# Patient Record
Sex: FEMALE | Race: BLACK OR AFRICAN AMERICAN | Employment: FULL TIME | URBAN - METROPOLITAN AREA
[De-identification: names, ages, dates, MRNs, and addresses within clinical notes are randomized per-mention and may not be internally consistent; named-entity substitution may affect disease eponyms.]

---

## 2021-11-15 ENCOUNTER — HOSPITAL ENCOUNTER (EMERGENCY)
Facility: HOSPITAL | Age: 46
Discharge: HOME | End: 2021-11-16
Attending: EMERGENCY MEDICINE
Payer: COMMERCIAL

## 2021-11-15 DIAGNOSIS — K59.03 DRUG-INDUCED CONSTIPATION: Primary | ICD-10-CM

## 2021-11-15 PROCEDURE — 99283 EMERGENCY DEPT VISIT LOW MDM: CPT

## 2021-11-15 PROCEDURE — 96372 THER/PROPH/DIAG INJ SC/IM: CPT

## 2021-11-16 VITALS
BODY MASS INDEX: 31.52 KG/M2 | RESPIRATION RATE: 20 BRPM | OXYGEN SATURATION: 98 % | HEART RATE: 97 BPM | HEIGHT: 68 IN | TEMPERATURE: 98 F | SYSTOLIC BLOOD PRESSURE: 142 MMHG | WEIGHT: 208 LBS | DIASTOLIC BLOOD PRESSURE: 82 MMHG

## 2021-11-16 RX ORDER — ACETAMINOPHEN 500 MG
1000 TABLET ORAL ONCE
Status: COMPLETED | OUTPATIENT
Start: 2021-11-16 | End: 2021-11-16

## 2021-11-16 RX ORDER — MAGNESIUM CARB/ALUMINUM HYDROX 105-160MG
296 TABLET,CHEWABLE ORAL ONCE
Status: COMPLETED | OUTPATIENT
Start: 2021-11-16 | End: 2021-11-16

## 2021-11-16 NOTE — ED QUICK NOTES
Pt provided and explained d/c instructions, at-home care, follow-up, and otc rx. Pt in nad at this time. No iv access. Vss. [place]. Ambulatory. A&ox3. Belongings with pt. All questions and concerns addressed.

## 2021-11-16 NOTE — ED INITIAL ASSESSMENT (HPI)
No bm for the 3, recent uterine fibroid embolization on Friday, rx pain meds and stool softeners at home, has not had success at home self correcting with dietary changes, milk of magnesia, and self administered enema.

## 2021-11-17 NOTE — ED PROVIDER NOTES
Patient Seen in: [facility] AND [facility] Emergency Department    History   Patient presents with:  Constipation      HPI    Patient presents to the ED complaining of severe constipation and abdominal discomfort.   She states that she had uterine fibroid emboliz well-developed. HENT:      Head: Normocephalic and atraumatic. Eyes:      General:         Right eye: No discharge. Left eye: No discharge. Conjunctiva/sclera: Conjunctivae normal.   Neck:      Trachea: No tracheal deviation.    Cardiovascu evaluation. ED Course: Patient presents to the ED with opiate use constipation. In significant distress. Given Relistor in the ED and mag citrate. Stable for discharge at this time with continued supportive care at home.     Additional verbal instruct

## 2025-02-23 ENCOUNTER — HOSPITAL ENCOUNTER (EMERGENCY)
Facility: HOSPITAL | Age: 50
Discharge: HOME | End: 2025-02-23
Attending: STUDENT IN AN ORGANIZED HEALTH CARE EDUCATION/TRAINING PROGRAM
Payer: COMMERCIAL

## 2025-02-23 ENCOUNTER — APPOINTMENT (OUTPATIENT)
Dept: GENERAL RADIOLOGY | Facility: HOSPITAL | Age: 50
End: 2025-02-23
Attending: STUDENT IN AN ORGANIZED HEALTH CARE EDUCATION/TRAINING PROGRAM
Payer: COMMERCIAL

## 2025-02-23 VITALS
TEMPERATURE: 98 F | HEART RATE: 65 BPM | BODY MASS INDEX: 30 KG/M2 | OXYGEN SATURATION: 98 % | SYSTOLIC BLOOD PRESSURE: 224 MMHG | DIASTOLIC BLOOD PRESSURE: 133 MMHG | RESPIRATION RATE: 18 BRPM | WEIGHT: 197 LBS

## 2025-02-23 DIAGNOSIS — M79.645 FINGER PAIN, LEFT: ICD-10-CM

## 2025-02-23 DIAGNOSIS — S46.812A STRAIN OF LEFT TRAPEZIUS MUSCLE, INITIAL ENCOUNTER: Primary | ICD-10-CM

## 2025-02-23 LAB
ATRIAL RATE: 82 BPM
P AXIS: 48 DEGREES
P-R INTERVAL: 178 MS
Q-T INTERVAL: 414 MS
QRS DURATION: 108 MS
QTC CALCULATION (BEZET): 483 MS
R AXIS: 3 DEGREES
T AXIS: 38 DEGREES
VENTRICULAR RATE: 82 BPM

## 2025-02-23 PROCEDURE — 73140 X-RAY EXAM OF FINGER(S): CPT | Performed by: STUDENT IN AN ORGANIZED HEALTH CARE EDUCATION/TRAINING PROGRAM

## 2025-02-23 PROCEDURE — 93010 ELECTROCARDIOGRAM REPORT: CPT

## 2025-02-23 PROCEDURE — 99284 EMERGENCY DEPT VISIT MOD MDM: CPT

## 2025-02-23 PROCEDURE — 96372 THER/PROPH/DIAG INJ SC/IM: CPT

## 2025-02-23 PROCEDURE — 93005 ELECTROCARDIOGRAM TRACING: CPT

## 2025-02-23 RX ORDER — CYCLOBENZAPRINE HCL 5 MG
10 TABLET ORAL ONCE
Status: COMPLETED | OUTPATIENT
Start: 2025-02-23 | End: 2025-02-23

## 2025-02-23 RX ORDER — LIDOCAINE 50 MG/G
1 PATCH TOPICAL EVERY 24 HOURS
Qty: 7 PATCH | Refills: 0 | Status: SHIPPED | OUTPATIENT
Start: 2025-02-23 | End: 2025-03-02

## 2025-02-23 RX ORDER — CYCLOBENZAPRINE HCL 10 MG
10 TABLET ORAL 3 TIMES DAILY PRN
Qty: 20 TABLET | Refills: 0 | Status: SHIPPED | OUTPATIENT
Start: 2025-02-23 | End: 2025-03-02

## 2025-02-23 RX ORDER — KETOROLAC TROMETHAMINE 15 MG/ML
15 INJECTION, SOLUTION INTRAMUSCULAR; INTRAVENOUS ONCE
Status: COMPLETED | OUTPATIENT
Start: 2025-02-23 | End: 2025-02-23

## 2025-02-23 RX ORDER — NAPROXEN 500 MG/1
500 TABLET ORAL 2 TIMES DAILY PRN
Qty: 10 TABLET | Refills: 0 | Status: SHIPPED | OUTPATIENT
Start: 2025-02-23 | End: 2025-02-28

## 2025-02-23 NOTE — ED INITIAL ASSESSMENT (HPI)
Left neck pain that radiates down left arm, atraumatic. Left hand 4th digit pain as well, secondary to injury x 3 weeks ago

## 2025-02-23 NOTE — ED QUICK NOTES
MD notified of patient's blood pressure. BP's equal bilaterally, patient denies chest pain and is not experiencing any shortness of breath. Is aware BP has been elevetated and is in the process of following up with her pcp in regards to this.

## 2025-02-23 NOTE — ED PROVIDER NOTES
[place] Emergency Department Note  Patient: Melodie Garcia Age: 49 year old Sex: female      MRN: [id number]  : 7/15/1975    Patient Seen in: [facility] Emergency Department    History     Chief Complaint   Patient presents with    Neck Pain     Stated Complaint: Neck/Arm Pain; Finger Injury    History obtained from: Patient    49-year-old female with past medical history of hypertension presenting today for evaluation of left upper back and shoulder pain.  She states over the past 1 week, she is been having headache throbbing the left upper back and shoulder.  States that pain occasionally radiates to her neck.  Any associated numbness, tingling, weakness, slurred speech or vision changes.  She denies any chest pain or shortness of breath.  Is also complaining of left fourth digit pain over the past 3 weeks after jamming her finger.  She denies any difficulty with range of motion.  States that she is taking over-the-counter pain medications with no significant treatment of symptoms.    Review of Systems:  Review of Systems  Positive for stated complaint: Neck/Arm Pain; Finger Injury. Constitutional and vital signs reviewed. All other systems reviewed and negative except as noted above.    Patient History:  Past Medical History:    Essential hypertension       History reviewed. No pertinent surgical history.     No family history on file.    Specific Social Determinants of Health:   Social History     Socioeconomic History    Marital status: Life Partner   Tobacco Use    Smoking status: Never    Smokeless tobacco: Never     Social Drivers of Health     Food Insecurity: No Food Insecurity (2024)    Received from [facility]    Food Insecurity     Within the past 30 days, I worried whether my food would run out before I got money to buy more. / En los últimos 30 días, me preocupó que la comida se podía acabar antes de tener dinero para compr...: Never true / Nunca     Within the  past 30 days, the food that I bought just didn't last, and I didn't have money to get more. / En los últimos 30 días, La comida que compré no rindió lo suficiente, y no tenía dinero para...: Never true / Nunca    [facility]    H[facility] Stability           [facility] and agreed except as otherwise stated in HPI.    Physical Exam     ED Triage Vitals [02/23/25 1451]   BP (!) 216/140   Pulse 88   Resp 18   Temp 97.7 °F (36.5 °C)   Temp src Temporal   SpO2 97 %   O2 Device None (Room air)       Current:BP (!) 224/133   Pulse 65   Temp 97.7 °F (36.5 °C) (Temporal)   Resp 18   Wt 89.4 kg   LMP 02/07/2025 (Approximate)   SpO2 98%   BMI 29.95 kg/m²         Physical Exam  Constitutional:       General: She is not in acute distress.  HENT:      Head: Normocephalic and atraumatic.      Mouth/Throat:      Mouth: Mucous membranes are moist.   Eyes:      Extraocular Movements: Extraocular movements intact.   Cardiovascular:      Rate and Rhythm: Normal rate and regular rhythm.      Heart sounds: Normal heart sounds.      Comments: Radial pulse are 2+ and equal bilaterally  Pulmonary:      Effort: Pulmonary effort is normal. No respiratory distress.      Breath sounds: Normal breath sounds.   Abdominal:      Palpations: Abdomen is soft.      Tenderness: There is no abdominal tenderness.   Musculoskeletal:      Comments: No midline cervical or thoracic tenderness, reproducible tenderness over the left trapezius muscle body and paraspinal muscles of the cervical neck no right-sided tenderness.  No reproducible tenderness over the clavicles bilaterally.     Skin:     General: Skin is warm and dry.      Capillary Refill: Capillary refill takes less than 2 seconds.      Findings: No rash.   Neurological:      General: No focal deficit present.      Mental Status: She is alert and oriented to person, place, and time.      Sensory: No sensory deficit.      Motor: No weakness.       Comments: Equal  strength bilaterally   Psychiatric:         Mood and Affect: Mood normal.         Behavior: Behavior normal.         ED Course   Labs:   Labs Reviewed - No data to display  Radiology findings:  I personally reviewed the images.   XR FINGER(S) (MIN 2 VIEWS), LEFT 4TH (CPT=73140)    Result Date: 2/23/2025  PROCEDURE: XR FINGER(S) (MIN 2 VIEWS), LEFT 4TH (CPT=73140)  COMPARISON: None.  INDICATIONS: Left 4th digit pain x 3 weeks. Crushing injury 3 weeks ago.  TECHNIQUE: 3. views were obtained.   FINDINGS/conclusions:   No acute fracture or dislocation.  No radiopaque foreign body.    Dictated by (CST): Shira Alonzo MD on 2/23/2025 at 5:06 PM     Finalized by (CST): Shira Alonzo MD on 2/23/2025 at 5:06 PM           EKG as interpreted by me: Ventricular rate 82, normal sinus rhythm, normal axis, no parable prolongation, narrow QRS, QTc 43 ms, no ST segment elevations or depressions, no abnormal T wave inversions  Cardiac Monitor: Interpreted by me.   Pulse Readings from Last 1 Encounters:   02/23/25 65   , sinus,     External non-ED records reviewed independently by me: Note from 6/17/2024 reviewed and confirmed patient also has a additional history of type 2 diabetes.  Baseline creatinine from/24/24 of 0.86.    MDM   49-year-old female with past medical history of hypertension type 2 diabetes without insulin dependency presenting today for evaluation of 1 week of atraumatic left upper back pain and 3 weeks of left ring finger pain.  Exam as above with no evidence of neurovascular injury.  No midline neck or back tenderness.  Reproducible tenderness over the left trapezius muscle body.  Left ring finger with normal active and passive range of motion and no significant swelling or effusion.    Differential diagnoses considered includes, but is not limited to: Sprain versus fracture versus dislocation, left trapezius muscle body sprain, considered vertebral injury however inconsistent with  physical examination.    Will obtain the following tests: Right left fourth digit  Please see ED course for my independent review of these tests/imaging results.    Initial Medications/Therapeutics administered: Patch, Flexeril, Toradol    Chronic conditions affecting care: Hypertension, type 2 diabetes    Workup and medications considered but not ordered: I considered Medrol Dosepak however given patient's history of type 2 diabetes, will defer    Social Determinants of Health that impacted care: None    ED Course as of 02/23/25 1739  ------------------------------------------------------------  Time: 02/23 1734  Comment: Independent reviewed the left finger x-ray that shows no evidence of obvious fracture.  Agree with radiology read above.  Suspect symptoms secondary to soft tissue sprain.  Also suspect trapezius muscle strain.  Upon discharge vital signs, patient remained significantly hypertensive.  She is hypertensive in bilateral upper extremities with no discrepancies.  She has no chest pain or neurologic deficits to suggest dissection.  She states that she has a well-known history of hypertension which has been poorly controlled recently and is currently following with her PCP for medication adjustments.  States that she is significantly hypertensive at baseline.  Discussed continued supportive care with over-the-counter pain medications also provided with prescription for lido patch and Flexeril in addition to naproxen.  Return precautions were discussed and all questions answered.  Patient expressed understanding and agreement with plan.          Disposition and Plan     Clinical Impression:  1. Strain of left trapezius muscle, initial encounter    2. Finger pain, left        Disposition:  Discharge    Follow-up:  Yandy Tran,   [address] [facility] [place] [id number] [phone]    Schedule an appointment as soon as possible for a visit in 3 day(s)  As needed, If symptoms  worsen      Medications Prescribed:  Current Discharge Medication List        START taking these medications    Details   cyclobenzaprine 10 MG Oral Tab Take 1 tablet (10 mg total) by mouth 3 (three) times daily as needed for Muscle spasms.  Qty: 20 tablet, Refills: 0      lidocaine 5 % External Patch Place 1 patch onto the skin daily for 7 days.  Qty: 7 patch, Refills: 0    Associated Diagnoses: Strain of left trapezius muscle, initial encounter      naproxen 500 MG Oral Tab Take 1 tablet (500 mg total) by mouth 2 (two) times daily as needed.  Qty: 10 tablet, Refills: 0               This note may have been created using voice dictation technology and may include inadvertent errors.      Renetta Church MD  Emergency Medicine

## 2025-02-23 NOTE — DISCHARGE INSTRUCTIONS
Thank you for seeking care at [facility] Emergency Department.  As discussed, you should take Ibuprofen (also called Advil or Motrin) or Naproxen (also called Aleve) for your pain. If you are taking Ibuprofen, you can take 600 mg every 6 hours, or 800 mg every 8 hours. If you are taking Naproxen, you can take 500 mg every 12 hours. Do not take more than this amount as it can cause kidney problems or bleeding in your stomach. Do not take these medications at the same time as it can increase your risk for these side effects. If you have a history of heart problems or have had uncontrolled blood pressure for a significant period of time you should not take these medications as it can increase your risk for heart attack or stroke.    If your pain is not controlled you can also take Acetaminophen (also called Tylenol) 1 gram every 6 hours. Do not take more than 4 grams over the course of a day from all medications you take. You can take this medication in alternation with Ibuprofen so that every 3 hours you are taking either 600 mg of Ibuprofen or 1 gram of Acetaminophen. If you have a history of liver disease you should not take Acetaminophen as it can worsen your liver function.    As previously advised, please follow up with your primary care doctor for further management of your pain.